# Patient Record
Sex: MALE | Race: WHITE | URBAN - METROPOLITAN AREA
[De-identification: names, ages, dates, MRNs, and addresses within clinical notes are randomized per-mention and may not be internally consistent; named-entity substitution may affect disease eponyms.]

---

## 2022-04-27 ENCOUNTER — WEB ENCOUNTER (OUTPATIENT)
Dept: URBAN - METROPOLITAN AREA CLINIC 82 | Facility: CLINIC | Age: 22
End: 2022-04-27

## 2022-04-27 ENCOUNTER — OFFICE VISIT (OUTPATIENT)
Dept: URBAN - METROPOLITAN AREA CLINIC 82 | Facility: CLINIC | Age: 22
End: 2022-04-27
Payer: COMMERCIAL

## 2022-04-27 VITALS
HEART RATE: 87 BPM | SYSTOLIC BLOOD PRESSURE: 133 MMHG | DIASTOLIC BLOOD PRESSURE: 79 MMHG | WEIGHT: 221 LBS | TEMPERATURE: 97.2 F | HEIGHT: 71 IN | BODY MASS INDEX: 30.94 KG/M2

## 2022-04-27 DIAGNOSIS — K59.09 CHRONIC CONSTIPATION: ICD-10-CM

## 2022-04-27 DIAGNOSIS — K29.70 GASTRITIS DETERMINED BY BIOPSY: ICD-10-CM

## 2022-04-27 DIAGNOSIS — K58.1 IRRITABLE BOWEL SYNDROME WITH CONSTIPATION: ICD-10-CM

## 2022-04-27 PROBLEM — 4556007: Status: ACTIVE | Noted: 2022-04-27

## 2022-04-27 PROCEDURE — 99203 OFFICE O/P NEW LOW 30 MIN: CPT | Performed by: INTERNAL MEDICINE

## 2022-04-27 NOTE — HPI-TODAY'S VISIT:
Path report 12/2021, no procedure report avail, Micah brito, gastritis no hpylori, gastric xanthoma, nml duodenum, GE jxn chr inflammation, TI nml. Chr constipation, 1 year, stomach growling, mucus stool, BM every 2-3 days, despite fiberone. tried PPI without relief. IBGard with some relief.

## 2022-06-29 ENCOUNTER — OFFICE VISIT (OUTPATIENT)
Dept: URBAN - METROPOLITAN AREA CLINIC 82 | Facility: CLINIC | Age: 22
End: 2022-06-29
Payer: COMMERCIAL

## 2022-06-29 VITALS
DIASTOLIC BLOOD PRESSURE: 81 MMHG | BODY MASS INDEX: 29.73 KG/M2 | WEIGHT: 212.4 LBS | HEIGHT: 71 IN | TEMPERATURE: 98.2 F | SYSTOLIC BLOOD PRESSURE: 121 MMHG | HEART RATE: 64 BPM

## 2022-06-29 DIAGNOSIS — K58.1 IRRITABLE BOWEL SYNDROME WITH CONSTIPATION: ICD-10-CM

## 2022-06-29 DIAGNOSIS — R20.2 TINGLING: ICD-10-CM

## 2022-06-29 PROBLEM — 440630006: Status: ACTIVE | Noted: 2022-04-27

## 2022-06-29 PROBLEM — 274676007: Status: ACTIVE | Noted: 2022-06-29

## 2022-06-29 PROCEDURE — 99213 OFFICE O/P EST LOW 20 MIN: CPT | Performed by: INTERNAL MEDICINE

## 2022-06-29 RX ORDER — PLECANATIDE 3 MG/1
1 TABLET TABLET ORAL ONCE A DAY
Qty: 90 | Refills: 3 | OUTPATIENT

## 2022-06-29 NOTE — HPI-TODAY'S VISIT:
Path report 12/2021, no procedure report avail, reports normal colonoscopy at that time, Micah brito, gastritis no hpylori, gastric xanthoma, nml duodenum, GE jxn chr inflammation, TI nml. Chr constipation, 90% time, 1 year, stomach growling, mucus stool, BM every 2-3 days, despite fiberone, tried otc laxatives without relief. tried PPI without relief. IBGard with some relief. Tried Linzess, seemed to wear off effect.

## 2022-07-01 ENCOUNTER — TELEPHONE ENCOUNTER (OUTPATIENT)
Dept: URBAN - METROPOLITAN AREA CLINIC 92 | Facility: CLINIC | Age: 22
End: 2022-07-01

## 2022-07-01 LAB
A/G RATIO: 2.3
ALBUMIN: 5
ALKALINE PHOSPHATASE: 79
ALT (SGPT): 19
AST (SGOT): 19
BASO (ABSOLUTE): 0
BASOS: 0
BILIRUBIN, TOTAL: 0.2
BUN/CREATININE RATIO: 10
BUN: 11
CALCIUM: 9.7
CARBON DIOXIDE, TOTAL: 26
CHLORIDE: 102
CREATININE: 1.08
EGFR: 100
ENDOMYSIAL ANTIBODY IGA: NEGATIVE
EOS (ABSOLUTE): 0.1
EOS: 2
FERRITIN, SERUM: 121
FOLATE (FOLIC ACID), SERUM: 18.6
GLOBULIN, TOTAL: 2.2
GLUCOSE: 91
HEMATOCRIT: 44.2
HEMATOLOGY COMMENTS:: (no result)
HEMOGLOBIN: 14.2
IMMATURE CELLS: (no result)
IMMATURE GRANS (ABS): 0
IMMATURE GRANULOCYTES: 0
IMMUNOGLOBULIN A, QN, SERUM: 151
IRON BIND.CAP.(TIBC): 307
IRON SATURATION: 24
IRON: 75
LYMPHS (ABSOLUTE): 1.1
LYMPHS: 34
MCH: 27.5
MCHC: 32.1
MCV: 86
MONOCYTES(ABSOLUTE): 0.3
MONOCYTES: 9
NEUTROPHILS (ABSOLUTE): 1.8
NEUTROPHILS: 55
NRBC: (no result)
PLATELETS: 200
POTASSIUM: 4.5
PROTEIN, TOTAL: 7.2
RBC: 5.17
RDW: 13.1
SODIUM: 143
T-TRANSGLUTAMINASE (TTG) IGA: <2
T4,FREE(DIRECT): 1.6
TSH: 0.93
UIBC: 232
VITAMIN B12: 932
VITAMIN D, 25-HYDROXY: 37.7
WBC: 3.3

## 2022-07-01 RX ORDER — LUBIPROSTONE 24 UG/1
1 CAPSULE WITH FOOD AND WATER CAPSULE, GELATIN COATED ORAL TWICE A DAY
Qty: 180 CAPSULE | Refills: 3 | OUTPATIENT

## 2022-08-10 ENCOUNTER — OFFICE VISIT (OUTPATIENT)
Dept: URBAN - METROPOLITAN AREA MEDICAL CENTER 28 | Facility: MEDICAL CENTER | Age: 22
End: 2022-08-10

## 2022-08-16 ENCOUNTER — DASHBOARD ENCOUNTERS (OUTPATIENT)
Age: 22
End: 2022-08-16

## 2022-08-22 ENCOUNTER — OFFICE VISIT (OUTPATIENT)
Dept: URBAN - METROPOLITAN AREA CLINIC 82 | Facility: CLINIC | Age: 22
End: 2022-08-22